# Patient Record
Sex: FEMALE | Race: WHITE | NOT HISPANIC OR LATINO | Employment: UNEMPLOYED | URBAN - METROPOLITAN AREA
[De-identification: names, ages, dates, MRNs, and addresses within clinical notes are randomized per-mention and may not be internally consistent; named-entity substitution may affect disease eponyms.]

---

## 2023-03-17 LAB — THYROTROPIN (MIU/L) IN SER/PLAS BY DETECTION LIMIT <= 0.05 MIU/L: 2.9 MIU/L (ref 0.44–3.98)

## 2023-10-18 RX ORDER — LINACLOTIDE 290 UG/1
290 CAPSULE, GELATIN COATED ORAL
COMMUNITY
End: 2023-10-24 | Stop reason: SDUPTHER

## 2023-10-18 NOTE — TELEPHONE ENCOUNTER
PT called in requesting a med refill med populated also pharmacy. I scheduled physical + med review for Tuesday of next week. Please advise

## 2023-10-23 PROBLEM — E01.0 THYROMEGALY: Status: ACTIVE | Noted: 2023-10-23

## 2023-10-23 PROBLEM — E55.9 VITAMIN D DEFICIENCY: Status: ACTIVE | Noted: 2023-10-23

## 2023-10-23 PROBLEM — R51.9 FREQUENT HEADACHES: Status: ACTIVE | Noted: 2023-10-23

## 2023-10-23 PROBLEM — F41.1 GENERALIZED ANXIETY DISORDER: Status: ACTIVE | Noted: 2023-10-23

## 2023-10-23 PROBLEM — E03.9 ACQUIRED HYPOTHYROIDISM: Status: ACTIVE | Noted: 2023-10-23

## 2023-10-23 PROBLEM — F32.A DEPRESSION: Status: ACTIVE | Noted: 2023-10-23

## 2023-10-23 PROBLEM — I47.10 SVT (SUPRAVENTRICULAR TACHYCARDIA) (CMS-HCC): Status: ACTIVE | Noted: 2023-10-23

## 2023-10-23 PROBLEM — D50.9 IRON DEFICIENCY ANEMIA: Status: ACTIVE | Noted: 2023-10-23

## 2023-10-23 PROBLEM — K58.1 IRRITABLE BOWEL SYNDROME WITH CONSTIPATION: Status: ACTIVE | Noted: 2023-10-23

## 2023-10-23 PROBLEM — F32.A MODERATELY SEVERE DEPRESSION: Status: ACTIVE | Noted: 2023-10-23

## 2023-10-23 PROBLEM — R42 DIZZINESS: Status: ACTIVE | Noted: 2023-10-23

## 2023-10-23 PROBLEM — R00.2 PALPITATIONS: Status: ACTIVE | Noted: 2023-10-23

## 2023-10-23 PROBLEM — E04.9 GOITER: Status: ACTIVE | Noted: 2023-10-23

## 2023-10-23 PROBLEM — G90.A POTS (POSTURAL ORTHOSTATIC TACHYCARDIA SYNDROME): Status: ACTIVE | Noted: 2023-10-23

## 2023-10-23 RX ORDER — CITALOPRAM 20 MG/1
1.5 TABLET, FILM COATED ORAL DAILY
COMMUNITY
Start: 2012-11-20 | End: 2023-10-24 | Stop reason: ALTCHOICE

## 2023-10-23 RX ORDER — MELATONIN 10 MG/ML
2 DROPS ORAL DAILY
COMMUNITY

## 2023-10-23 RX ORDER — IBUPROFEN 600 MG/1
TABLET ORAL
COMMUNITY
Start: 2012-12-05

## 2023-10-23 RX ORDER — NORETHINDRONE ACETATE AND ETHINYL ESTRADIOL, ETHINYL ESTRADIOL AND FERROUS FUMARATE 1MG-10(24)
1 KIT ORAL DAILY
COMMUNITY
Start: 2013-04-26 | End: 2023-10-24 | Stop reason: ALTCHOICE

## 2023-10-23 RX ORDER — LEVOTHYROXINE SODIUM 50 UG/1
1 TABLET ORAL
COMMUNITY
Start: 2022-03-23 | End: 2023-10-24 | Stop reason: SDUPTHER

## 2023-10-24 ENCOUNTER — OFFICE VISIT (OUTPATIENT)
Dept: PRIMARY CARE | Facility: CLINIC | Age: 33
End: 2023-10-24
Payer: COMMERCIAL

## 2023-10-24 VITALS
SYSTOLIC BLOOD PRESSURE: 110 MMHG | DIASTOLIC BLOOD PRESSURE: 70 MMHG | BODY MASS INDEX: 23.87 KG/M2 | HEIGHT: 64 IN | TEMPERATURE: 96.3 F | OXYGEN SATURATION: 99 % | WEIGHT: 139.8 LBS | HEART RATE: 84 BPM

## 2023-10-24 DIAGNOSIS — E04.9 GOITER: ICD-10-CM

## 2023-10-24 DIAGNOSIS — D50.8 IRON DEFICIENCY ANEMIA SECONDARY TO INADEQUATE DIETARY IRON INTAKE: ICD-10-CM

## 2023-10-24 DIAGNOSIS — E55.9 VITAMIN D DEFICIENCY: ICD-10-CM

## 2023-10-24 DIAGNOSIS — E03.9 ACQUIRED HYPOTHYROIDISM: ICD-10-CM

## 2023-10-24 DIAGNOSIS — Z00.00 ROUTINE HEALTH MAINTENANCE: Primary | ICD-10-CM

## 2023-10-24 DIAGNOSIS — K58.1 IRRITABLE BOWEL SYNDROME WITH CONSTIPATION: ICD-10-CM

## 2023-10-24 PROCEDURE — 99213 OFFICE O/P EST LOW 20 MIN: CPT | Performed by: FAMILY MEDICINE

## 2023-10-24 PROCEDURE — 99395 PREV VISIT EST AGE 18-39: CPT | Performed by: FAMILY MEDICINE

## 2023-10-24 PROCEDURE — 99213 OFFICE O/P EST LOW 20 MIN: CPT | Mod: 25 | Performed by: FAMILY MEDICINE

## 2023-10-24 PROCEDURE — 1036F TOBACCO NON-USER: CPT | Performed by: FAMILY MEDICINE

## 2023-10-24 RX ORDER — LINACLOTIDE 290 UG/1
290 CAPSULE, GELATIN COATED ORAL
Qty: 30 CAPSULE | OUTPATIENT
Start: 2023-10-24

## 2023-10-24 RX ORDER — LEVOTHYROXINE SODIUM 50 UG/1
50 TABLET ORAL
Qty: 90 TABLET | Refills: 0 | Status: SHIPPED | OUTPATIENT
Start: 2023-10-24 | End: 2024-04-19

## 2023-10-24 RX ORDER — LINACLOTIDE 290 UG/1
290 CAPSULE, GELATIN COATED ORAL
Qty: 90 CAPSULE | Refills: 1 | Status: SHIPPED | OUTPATIENT
Start: 2023-10-24 | End: 2024-04-19

## 2023-10-24 ASSESSMENT — PATIENT HEALTH QUESTIONNAIRE - PHQ9
2. FEELING DOWN, DEPRESSED OR HOPELESS: NOT AT ALL
SUM OF ALL RESPONSES TO PHQ9 QUESTIONS 1 AND 2: 0
1. LITTLE INTEREST OR PLEASURE IN DOING THINGS: NOT AT ALL

## 2023-10-24 ASSESSMENT — PAIN SCALES - GENERAL: PAINLEVEL: 0-NO PAIN

## 2023-10-24 NOTE — PATIENT INSTRUCTIONS
Problem List Items Addressed This Visit             ICD-10-CM    Acquired hypothyroidism E03.9    Relevant Medications    levothyroxine (Synthroid, Levoxyl) 50 mcg tablet    Other Relevant Orders    Lipid Panel    Thyroid Stimulating Hormone    CBC    Comprehensive Metabolic Panel    Vitamin D 25-Hydroxy,Total (for eval of Vitamin D levels)    Iron and TIBC    Irritable bowel syndrome with constipation K58.1    Relevant Medications    Linzess 290 mcg capsule    Other Relevant Orders    Lipid Panel    Thyroid Stimulating Hormone    CBC    Comprehensive Metabolic Panel    Vitamin D 25-Hydroxy,Total (for eval of Vitamin D levels)    Iron and TIBC    Goiter E04.9    Relevant Orders    US thyroid    Iron deficiency anemia D50.9    Relevant Orders    Lipid Panel    Thyroid Stimulating Hormone    CBC    Comprehensive Metabolic Panel    Vitamin D 25-Hydroxy,Total (for eval of Vitamin D levels)    Iron and TIBC    Vitamin D deficiency E55.9    Relevant Orders    Lipid Panel    Thyroid Stimulating Hormone    CBC    Comprehensive Metabolic Panel    Vitamin D 25-Hydroxy,Total (for eval of Vitamin D levels)    Iron and TIBC     Other Visit Diagnoses         Codes    Routine health maintenance    -  Primary Z00.00    Relevant Orders    Lipid Panel    Thyroid Stimulating Hormone    CBC    Comprehensive Metabolic Panel    Vitamin D 25-Hydroxy,Total (for eval of Vitamin D levels)    Iron and TIBC            Additional Visit Plans:  - Complete history and physical examination was performed      GENERAL RECOMMENDATIONS:  - Provided you with handouts on healthy eating, including the Top Ten Tips for a Healthy Diet  - I encourage you to eat a low-fat, moderate-carbohydrate, low-calorie diet to maintain a normal BMI (under 25) to reduce heart disease, and risk for diabetes  - Moderate intensity exercise for 30 minutes 5 days per week is recommended  - Along with recommendations for nutrition and exercise discussed today helpful resources  recommended by the American Academy of Family Practice can be found at www.familydoctor.org or www.choosemyplate.gov  - Can also consider enrolling in a program such as Weight Watchers or Ele [place]. The most effective diet is going to be one you can follow long term and turn into a lifestyle  - I recommend a routine vision check and dental cleanings every 6 months      BLOOD TESTING:  - We will obtain routine blood work, please have this done when you are fasting. The office will notify you of the test results once they are available and make any treatment recommendations accordingly  - Blood work may include a cholesterol and diabetes screen if risk factors exist (overweight, high blood pressure etc); screening for sexually transmitted infections; and a one time Hepatitis C Virus screen for those born between 4160-8742.      VACCINATION RECOMMENDATIONS:  - Flu shot annually.  - Tetanus booster every 10 years.  Up-to-date, due 2027  - HPV vaccine. Reportedly completed  - Two pneumonia vaccinations starting at 65 years old (or earlier if risk factors - smoker, diabetic, heart or lung conditions) - not due yet.  - Shingles vaccine for those 50 years or older - not due yet.      SCREENING RECOMMENDATIONS:  - Cervical cancer screening (pap test) in women starting at age 21 until age 65 years old.  Up-to-date, continue see gynecology  - Mammogram screening for breast cancer in women starting at 40-50 years and every 1-2 years - not due yet.  - Bone density screening (DEXA) for osteoporosis in women aged 65 years and older (in younger women who are higher risk) - not due yet.  - Colon cancer screening (with colonoscopy or Cologuard) for men and women starting at age 45 until 74 years old (or earlier if family history of colon cancer) - not due yet.    -Medication refills provided today.  We will let you know if we need to adjust your thyroid medication dose based on blood work results.  Continue to work on good  digestive health and follow-up in 6 months for medication recheck and refills or sooner if needed.  We will let you know if we need to adjust your vitamin D or iron dosing    - Plan to recheck thyroid ultrasound in April    Next Wellness Exam/Annual Physical Due  In 1 year from today    Patient Care Team:  Luis Alfredo Hein DO as PCP - General  Luis Alfredo Hein DO as PCP - [facility] Medicaid PCP  Luis Alfredo Hein DO (Family Medicine)    Luis Alfredo Hein DO   10/24/23   11:51 AM

## 2023-10-24 NOTE — PROGRESS NOTES
Outpatient Visit Note    Chief Complaint   Patient presents with    Annual Exam     Pt needs refills on linzess       HPI:  Elvin Osullivan is a 33 y.o. female here  for a complete physical.  Would also like to do a medication follow-up visit separately today to discuss her conditions and get refills.    She has iron deficiency anemia for which she is on slow release and iron.  She takes this 2-3 times a week. Any more makes her constipated.     She has IBS-C which is well managed with Linzess use.  She takes this daily without any diarrhea.  Tolerated well without concerning side effects.  Needs refills today.    Has hypothyroidism for which she is on 50 mcg of levothyroxine once daily.  Denies any heat or cold intolerance, dry skin, diarrhea, constipation or unintentional weight changes.  Having some hair loss. No concerning side effects.    She has vitamin D deficiency for which she is on 2000 IU of vitamin D daily.    She is due for routine blood work at this time.    Health Maintenance.  Immunizations: Tdap is due 2027.  She thinks HPV vaccine was received. Influenza vaccine declined.    Denies smoking or illicit drug use, drinks 0 alcoholic beverages a week. Patient reports routine vision checks and dental cleanings, and does not get regular exercise. Patient is not fasting for routine blood work today.    There is no family history of colon, breast or ovarian cancer.  Screening pap done 2021 with gynecology and was negative.  She continues to see gynecology.    Otherwise denies fevers, chills, cold/flu symptoms, SOB, CP, N/V, abdominal pain, dysuria, hematuria, melena, diarrhea or LE edema      Past Medical History:   Diagnosis Date    Anxiety     Depression     Disease of thyroid gland     Encounter for initial prescription of contraceptive pills 05/17/2013    Encounter for initial prescription of contraceptive pills        Current Medications  Current Outpatient Medications   Medication Instructions     cholecalciferol, vitamin D3, 50 mcg (2,000 unit) tablet,chewable 2 capsules, oral, Daily    ferrous sulfate ER (Slow Fe) 142 mg ER tablet 1 tablet, oral, Daily, For 30 days    ibuprofen 600 mg tablet oral    levothyroxine (SYNTHROID, LEVOXYL) 50 mcg, oral, Daily before breakfast, on an empty stomach    Linzess 290 mcg, oral, Daily before breakfast        Allergies  No Known Allergies     Immunizations  Immunization History   Administered Date(s) Administered    Flu vaccine (IIV4), preservative free *Check age/dose* 11/08/2021    Tdap vaccine, age 7 year and older (BOOSTRIX) 01/01/2017        History reviewed. No pertinent surgical history.  Family History   Problem Relation Name Age of Onset    Hypertension Mother      Anxiety disorder Mother      Hypertension Father      Hyperlipidemia Father      COPD Paternal Grandmother      Hypertension Paternal Grandmother      Diabetes Paternal Grandmother       Social History     Tobacco Use    Smoking status: Never     Passive exposure: Never    Smokeless tobacco: Never   Vaping Use    Vaping Use: Never used   Substance Use Topics    Alcohol use: Never    Drug use: Never     Tobacco Use: Low Risk  (10/24/2023)    Patient History     Smoking Tobacco Use: Never     Smokeless Tobacco Use: Never     Passive Exposure: Never        ROS  All pertinent positive symptoms are included in the history of present illness.  All other systems have been reviewed and are negative and noncontributory to this patient's current ailments.    VITAL SIGNS  Vitals:    10/24/23 1121   BP: 110/70   Pulse: 84   Temp: 35.7 °C (96.3 °F)   SpO2: 99%     Vitals:    10/24/23 1121   Weight: 63.4 kg (139 lb 12.8 oz)      Body mass index is 24 kg/m².     PHYSICAL EXAM  GENERAL APPEARANCE: well nourished, well developed, looks like stated age, in no acute distress, not ill or tired appearing, conversing well.   HEENT: no trauma, normocephalic. PERRLA and EOMI with normal external exam. TM's intact with no  injection or effusion, no signs of infection. Nares patent, turbinates pink without discharge. Pharynx pink with no exudates or lesions, no enlarged tonsils.   NECK: no nodes, supple without rigidity, no neck mass was observed, thyromegaly present  HEART: regular rate and rhythm, S1 and S2 heard with no murmurs or skipped beats, no carotid bruits.   LUNGS: clear to auscultation bilaterally with no wheezes, crackles or rales.   ABDOMEN: no organomegaly, soft, nontender, nondistended, normal bowel sounds, no guarding/rebound/rigidity.   EXTREMITIES: moving all extremities equally with no edema or deformities.   SKIN: normal skin color and pigmentation, normal skin turgor without rash, lesions, or nodules visualized.   NEUROLOGIC EXAM: CN II-XII grossly intact, normal gait, normal balance, 5/5 muscle strength, sensation grossly intact.   PSYCH: mood and affect appropriate; alert and oriented to time, place, person; no difficulty with speech or language.   LYMPH NODES: no cervical lymphadenopathy.           Assessment/Plan   Problem List Items Addressed This Visit             ICD-10-CM    Acquired hypothyroidism E03.9    Relevant Medications    levothyroxine (Synthroid, Levoxyl) 50 mcg tablet    Other Relevant Orders    Lipid Panel    Thyroid Stimulating Hormone    CBC    Comprehensive Metabolic Panel    Vitamin D 25-Hydroxy,Total (for eval of Vitamin D levels)    Iron and TIBC    Irritable bowel syndrome with constipation K58.1    Relevant Medications    Linzess 290 mcg capsule    Other Relevant Orders    Lipid Panel    Thyroid Stimulating Hormone    CBC    Comprehensive Metabolic Panel    Vitamin D 25-Hydroxy,Total (for eval of Vitamin D levels)    Iron and TIBC    Goiter E04.9    Relevant Orders    US thyroid    Iron deficiency anemia D50.9    Relevant Orders    Lipid Panel    Thyroid Stimulating Hormone    CBC    Comprehensive Metabolic Panel    Vitamin D 25-Hydroxy,Total (for eval of Vitamin D levels)    Iron and  TIBC    Vitamin D deficiency E55.9    Relevant Orders    Lipid Panel    Thyroid Stimulating Hormone    CBC    Comprehensive Metabolic Panel    Vitamin D 25-Hydroxy,Total (for eval of Vitamin D levels)    Iron and TIBC     Other Visit Diagnoses         Codes    Routine health maintenance    -  Primary Z00.00    Relevant Orders    Lipid Panel    Thyroid Stimulating Hormone    CBC    Comprehensive Metabolic Panel    Vitamin D 25-Hydroxy,Total (for eval of Vitamin D levels)    Iron and TIBC            Additional Visit Plans:  - Complete history and physical examination was performed      GENERAL RECOMMENDATIONS:  - Provided you with handouts on healthy eating, including the Top Ten Tips for a Healthy Diet  - I encourage you to eat a low-fat, moderate-carbohydrate, low-calorie diet to maintain a normal BMI (under 25) to reduce heart disease, and risk for diabetes  - Moderate intensity exercise for 30 minutes 5 days per week is recommended  - Along with recommendations for nutrition and exercise discussed today helpful resources recommended by the American Academy of Family Practice can be found at www.familydoctor.org or www.choosemyplate.gov  - Can also consider enrolling in a program such as Weight Watchers or Ele [place]. The most effective diet is going to be one you can follow long term and turn into a lifestyle  - I recommend a routine vision check and dental cleanings every 6 months      BLOOD TESTING:  - We will obtain routine blood work, please have this done when you are fasting. The office will notify you of the test results once they are available and make any treatment recommendations accordingly  - Blood work may include a cholesterol and diabetes screen if risk factors exist (overweight, high blood pressure etc); screening for sexually transmitted infections; and a one time Hepatitis C Virus screen for those born between 3102-9288.      VACCINATION RECOMMENDATIONS:  - Flu shot annually.  - Tetanus booster  every 10 years.  Up-to-date, due 2027  - HPV vaccine. Reportedly completed  - Two pneumonia vaccinations starting at 65 years old (or earlier if risk factors - smoker, diabetic, heart or lung conditions) - not due yet.  - Shingles vaccine for those 50 years or older - not due yet.      SCREENING RECOMMENDATIONS:  - Cervical cancer screening (pap test) in women starting at age 21 until age 65 years old.  Up-to-date, continue see gynecology  - Mammogram screening for breast cancer in women starting at 40-50 years and every 1-2 years - not due yet.  - Bone density screening (DEXA) for osteoporosis in women aged 65 years and older (in younger women who are higher risk) - not due yet.  - Colon cancer screening (with colonoscopy or Cologuard) for men and women starting at age 45 until 74 years old (or earlier if family history of colon cancer) - not due yet.    -Medication refills provided today.  We will let you know if we need to adjust your thyroid medication dose based on blood work results.  Continue to work on good digestive health and follow-up in 6 months for medication recheck and refills or sooner if needed.  We will let you know if we need to adjust your vitamin D or iron dosing    - Plan to recheck thyroid ultrasound in April    Next Wellness Exam/Annual Physical Due  In 1 year from today    Patient Care Team:  Luis Alfredo Hein DO as PCP - General  Luis Alfredo Hein DO as PCP - [facility] Medicaid PCP  Luis Alfredo Hein DO (Family Medicine)    Luis Alfredo Hein DO   10/27/23   9:47 AM

## 2023-11-22 DIAGNOSIS — E03.9 ACQUIRED HYPOTHYROIDISM: ICD-10-CM

## 2023-11-24 ENCOUNTER — APPOINTMENT (OUTPATIENT)
Dept: PHYSICAL THERAPY | Facility: CLINIC | Age: 33
End: 2023-11-24
Payer: COMMERCIAL

## 2023-11-28 RX ORDER — LEVOTHYROXINE SODIUM 50 UG/1
50 TABLET ORAL
Qty: 90 TABLET | Refills: 0 | OUTPATIENT
Start: 2023-11-28 | End: 2024-02-26

## 2024-03-07 ENCOUNTER — LAB (OUTPATIENT)
Dept: LAB | Facility: LAB | Age: 34
End: 2024-03-07
Payer: COMMERCIAL

## 2024-03-07 DIAGNOSIS — E03.9 ACQUIRED HYPOTHYROIDISM: ICD-10-CM

## 2024-03-07 DIAGNOSIS — K58.1 IRRITABLE BOWEL SYNDROME WITH CONSTIPATION: ICD-10-CM

## 2024-03-07 DIAGNOSIS — D50.8 IRON DEFICIENCY ANEMIA SECONDARY TO INADEQUATE DIETARY IRON INTAKE: ICD-10-CM

## 2024-03-07 DIAGNOSIS — Z00.00 ROUTINE HEALTH MAINTENANCE: ICD-10-CM

## 2024-03-07 DIAGNOSIS — E55.9 VITAMIN D DEFICIENCY: ICD-10-CM

## 2024-03-07 LAB
25(OH)D3 SERPL-MCNC: 28 NG/ML (ref 30–100)
ALBUMIN SERPL BCP-MCNC: 4.8 G/DL (ref 3.4–5)
ALP SERPL-CCNC: 53 U/L (ref 33–110)
ALT SERPL W P-5'-P-CCNC: 8 U/L (ref 7–45)
ANION GAP SERPL CALC-SCNC: 11 MMOL/L (ref 10–20)
AST SERPL W P-5'-P-CCNC: 13 U/L (ref 9–39)
BILIRUB SERPL-MCNC: 0.5 MG/DL (ref 0–1.2)
BUN SERPL-MCNC: 8 MG/DL (ref 6–23)
CALCIUM SERPL-MCNC: 10.2 MG/DL (ref 8.6–10.6)
CHLORIDE SERPL-SCNC: 106 MMOL/L (ref 98–107)
CHOLEST SERPL-MCNC: 155 MG/DL (ref 0–199)
CHOLESTEROL/HDL RATIO: 2.5
CO2 SERPL-SCNC: 27 MMOL/L (ref 21–32)
CREAT SERPL-MCNC: 0.65 MG/DL (ref 0.5–1.05)
EGFRCR SERPLBLD CKD-EPI 2021: >90 ML/MIN/1.73M*2
ERYTHROCYTE [DISTWIDTH] IN BLOOD BY AUTOMATED COUNT: 15.6 % (ref 11.5–14.5)
GLUCOSE SERPL-MCNC: 72 MG/DL (ref 74–99)
HCT VFR BLD AUTO: 39.4 % (ref 36–46)
HDLC SERPL-MCNC: 61.4 MG/DL
HGB BLD-MCNC: 11.4 G/DL (ref 12–16)
IRON SATN MFR SERPL: 6 % (ref 25–45)
IRON SERPL-MCNC: 24 UG/DL (ref 35–150)
LDLC SERPL CALC-MCNC: 80 MG/DL
MCH RBC QN AUTO: 24.7 PG (ref 26–34)
MCHC RBC AUTO-ENTMCNC: 28.9 G/DL (ref 32–36)
MCV RBC AUTO: 85 FL (ref 80–100)
NON HDL CHOLESTEROL: 94 MG/DL (ref 0–149)
NRBC BLD-RTO: 0 /100 WBCS (ref 0–0)
PLATELET # BLD AUTO: 447 X10*3/UL (ref 150–450)
POTASSIUM SERPL-SCNC: 4.4 MMOL/L (ref 3.5–5.3)
PROT SERPL-MCNC: 7.5 G/DL (ref 6.4–8.2)
RBC # BLD AUTO: 4.62 X10*6/UL (ref 4–5.2)
SODIUM SERPL-SCNC: 140 MMOL/L (ref 136–145)
TIBC SERPL-MCNC: 430 UG/DL (ref 240–445)
TRIGL SERPL-MCNC: 69 MG/DL (ref 0–149)
TSH SERPL-ACNC: 2.98 MIU/L (ref 0.44–3.98)
UIBC SERPL-MCNC: 406 UG/DL (ref 110–370)
VLDL: 14 MG/DL (ref 0–40)
WBC # BLD AUTO: 5.6 X10*3/UL (ref 4.4–11.3)

## 2024-03-07 PROCEDURE — 84443 ASSAY THYROID STIM HORMONE: CPT

## 2024-03-07 PROCEDURE — 85027 COMPLETE CBC AUTOMATED: CPT

## 2024-03-07 PROCEDURE — 36415 COLL VENOUS BLD VENIPUNCTURE: CPT

## 2024-03-07 PROCEDURE — 83550 IRON BINDING TEST: CPT

## 2024-03-07 PROCEDURE — 83540 ASSAY OF IRON: CPT

## 2024-03-07 PROCEDURE — 80061 LIPID PANEL: CPT

## 2024-03-07 PROCEDURE — 82306 VITAMIN D 25 HYDROXY: CPT

## 2024-03-07 PROCEDURE — 80053 COMPREHEN METABOLIC PANEL: CPT

## 2024-03-19 DIAGNOSIS — E55.9 VITAMIN D DEFICIENCY: Primary | ICD-10-CM

## 2024-03-19 DIAGNOSIS — D50.8 IRON DEFICIENCY ANEMIA SECONDARY TO INADEQUATE DIETARY IRON INTAKE: ICD-10-CM

## 2024-03-28 ENCOUNTER — HOSPITAL ENCOUNTER (OUTPATIENT)
Dept: RADIOLOGY | Facility: CLINIC | Age: 34
Discharge: HOME | End: 2024-03-28
Payer: COMMERCIAL

## 2024-03-28 DIAGNOSIS — E04.9 GOITER: ICD-10-CM

## 2024-03-28 PROCEDURE — 76536 US EXAM OF HEAD AND NECK: CPT | Performed by: RADIOLOGY

## 2024-03-28 PROCEDURE — 76536 US EXAM OF HEAD AND NECK: CPT

## 2024-04-17 DIAGNOSIS — K58.1 IRRITABLE BOWEL SYNDROME WITH CONSTIPATION: ICD-10-CM

## 2024-04-17 DIAGNOSIS — E03.9 ACQUIRED HYPOTHYROIDISM: ICD-10-CM

## 2024-04-17 NOTE — TELEPHONE ENCOUNTER
Pharmacy requesting refills for populated medication. Lm for pt to call back to schedule 6 month follow up.

## 2024-04-19 RX ORDER — LEVOTHYROXINE SODIUM 50 UG/1
50 TABLET ORAL
Qty: 90 TABLET | Refills: 0 | Status: SHIPPED | OUTPATIENT
Start: 2024-04-19 | End: 2024-07-18

## 2024-04-19 RX ORDER — LINACLOTIDE 290 UG/1
290 CAPSULE, GELATIN COATED ORAL
Qty: 30 CAPSULE | Refills: 5 | Status: SHIPPED | OUTPATIENT
Start: 2024-04-19 | End: 2024-10-16

## 2024-05-13 ENCOUNTER — APPOINTMENT (OUTPATIENT)
Dept: OBSTETRICS AND GYNECOLOGY | Facility: CLINIC | Age: 34
End: 2024-05-13
Payer: COMMERCIAL

## 2024-07-16 DIAGNOSIS — E03.9 ACQUIRED HYPOTHYROIDISM: ICD-10-CM

## 2024-07-17 NOTE — TELEPHONE ENCOUNTER
Last OV 03/07/24  Next OV Not scheduled sent [facility] message to schedule a follow up appt.   Last Filled 04/19/24

## 2024-07-18 RX ORDER — LEVOTHYROXINE SODIUM 50 UG/1
50 TABLET ORAL
Qty: 30 TABLET | Refills: 2 | Status: SHIPPED | OUTPATIENT
Start: 2024-07-18 | End: 2024-10-16

## 2024-10-04 DIAGNOSIS — E03.9 ACQUIRED HYPOTHYROIDISM: ICD-10-CM

## 2024-10-07 RX ORDER — LEVOTHYROXINE SODIUM 50 UG/1
50 TABLET ORAL
Qty: 30 TABLET | Refills: 1 | Status: SHIPPED | OUTPATIENT
Start: 2024-10-07 | End: 2025-01-05

## 2024-10-14 ENCOUNTER — PATIENT MESSAGE (OUTPATIENT)
Dept: PRIMARY CARE | Facility: CLINIC | Age: 34
End: 2024-10-14
Payer: COMMERCIAL

## 2024-11-11 ENCOUNTER — OFFICE VISIT (OUTPATIENT)
Dept: PRIMARY CARE | Facility: CLINIC | Age: 34
End: 2024-11-11
Payer: COMMERCIAL

## 2024-11-11 VITALS
TEMPERATURE: 97.7 F | OXYGEN SATURATION: 97 % | HEART RATE: 105 BPM | RESPIRATION RATE: 16 BRPM | DIASTOLIC BLOOD PRESSURE: 70 MMHG | BODY MASS INDEX: 21.34 KG/M2 | WEIGHT: 125 LBS | SYSTOLIC BLOOD PRESSURE: 108 MMHG | HEIGHT: 64 IN

## 2024-11-11 DIAGNOSIS — E55.9 VITAMIN D DEFICIENCY: ICD-10-CM

## 2024-11-11 DIAGNOSIS — E03.9 ACQUIRED HYPOTHYROIDISM: ICD-10-CM

## 2024-11-11 DIAGNOSIS — D50.8 IRON DEFICIENCY ANEMIA SECONDARY TO INADEQUATE DIETARY IRON INTAKE: ICD-10-CM

## 2024-11-11 DIAGNOSIS — Z00.00 ROUTINE HEALTH MAINTENANCE: ICD-10-CM

## 2024-11-11 DIAGNOSIS — K58.1 IRRITABLE BOWEL SYNDROME WITH CONSTIPATION: Primary | ICD-10-CM

## 2024-11-11 PROCEDURE — 99214 OFFICE O/P EST MOD 30 MIN: CPT | Performed by: FAMILY MEDICINE

## 2024-11-11 PROCEDURE — 3008F BODY MASS INDEX DOCD: CPT | Performed by: FAMILY MEDICINE

## 2024-11-11 RX ORDER — LINACLOTIDE 290 UG/1
290 CAPSULE, GELATIN COATED ORAL
Qty: 90 CAPSULE | Refills: 1 | Status: SHIPPED | OUTPATIENT
Start: 2024-11-11 | End: 2025-05-10

## 2024-11-11 RX ORDER — LEVOTHYROXINE SODIUM 50 UG/1
50 TABLET ORAL
Qty: 90 TABLET | Refills: 1 | Status: SHIPPED | OUTPATIENT
Start: 2024-11-11 | End: 2025-05-10

## 2024-11-11 ASSESSMENT — ENCOUNTER SYMPTOMS
DEPRESSION: 0
OCCASIONAL FEELINGS OF UNSTEADINESS: 0
LOSS OF SENSATION IN FEET: 0

## 2024-11-11 ASSESSMENT — PATIENT HEALTH QUESTIONNAIRE - PHQ9
2. FEELING DOWN, DEPRESSED OR HOPELESS: NOT AT ALL
1. LITTLE INTEREST OR PLEASURE IN DOING THINGS: NOT AT ALL
SUM OF ALL RESPONSES TO PHQ9 QUESTIONS 1 & 2: 0

## 2024-11-11 ASSESSMENT — PAIN SCALES - GENERAL: PAINLEVEL_OUTOF10: 0-NO PAIN

## 2024-11-11 ASSESSMENT — COLUMBIA-SUICIDE SEVERITY RATING SCALE - C-SSRS
2. HAVE YOU ACTUALLY HAD ANY THOUGHTS OF KILLING YOURSELF?: NO
6. HAVE YOU EVER DONE ANYTHING, STARTED TO DO ANYTHING, OR PREPARED TO DO ANYTHING TO END YOUR LIFE?: NO
1. IN THE PAST MONTH, HAVE YOU WISHED YOU WERE DEAD OR WISHED YOU COULD GO TO SLEEP AND NOT WAKE UP?: NO

## 2024-11-11 NOTE — PROGRESS NOTES
Outpatient Visit Note    Chief Complaint   Patient presents with    Follow-up     Meds        HPI:  Elvin Osullivan is a 34 y.o. female here for follow-up on her medications.    She has iron deficiency anemia for which she is on slow release and iron.  She takes this 2-3 times a week. Any more makes her constipated.      She has IBS-C which is well managed with Linzess use.  She takes this daily without any diarrhea.  Tolerated well without concerning side effects.  Needs refills today.     Has hypothyroidism for which she is on 50 mcg of levothyroxine once daily.  Denies any heat intolerance, dry skin, diarrhea, constipation.  Having some hair loss at baseline. Maybe feeling a little cold, has lost some weight in the last year, still normal BMI. No concerning side effects.      She has vitamin D deficiency for which she is on 2000 IU of vitamin D daily.     She is due for a complete physical    PHQ9/GAD7:         Patient Active Problem List    Diagnosis Date Noted    Acquired hypothyroidism 10/23/2023    Irritable bowel syndrome with constipation 10/23/2023    Depression 10/23/2023    Dizziness 10/23/2023    Frequent headaches 10/23/2023    Generalized anxiety disorder 10/23/2023    Goiter 10/23/2023    Thyromegaly 10/23/2023    Iron deficiency anemia 10/23/2023    Moderately severe depression 10/23/2023    Palpitations 10/23/2023    POTS (postural orthostatic tachycardia syndrome) 10/23/2023    SVT (supraventricular tachycardia) (CMS-[facility]) 10/23/2023    Vitamin D deficiency 10/23/2023        Past Medical History:   Diagnosis Date    Anxiety     Depression     Disease of thyroid gland     Encounter for initial prescription of contraceptive pills 05/17/2013    Encounter for initial prescription of contraceptive pills        Current Medications  Current Outpatient Medications   Medication Instructions    cholecalciferol, vitamin D3, 50 mcg (2,000 unit) tablet,chewable 2 capsules, Daily    ferrous sulfate ER  (Slow Fe) 142 mg ER tablet 1 tablet, Daily    levothyroxine (SYNTHROID, LEVOXYL) 50 mcg, oral, Daily before breakfast, on an empty stomach    Linzess 290 mcg, oral, Daily before breakfast        Allergies  No Known Allergies     History reviewed. No pertinent surgical history.  Family History   Problem Relation Name Age of Onset    Hypertension Mother      Anxiety disorder Mother      Hypertension Father      Hyperlipidemia Father      COPD Paternal Grandmother      Hypertension Paternal Grandmother      Diabetes Paternal Grandmother       Social History     Tobacco Use    Smoking status: Never     Passive exposure: Never    Smokeless tobacco: Never   Vaping Use    Vaping status: Never Used   Substance Use Topics    Alcohol use: Never    Drug use: Never     Tobacco Use: Low Risk  (11/11/2024)    Patient History     Smoking Tobacco Use: Never     Smokeless Tobacco Use: Never     Passive Exposure: Never        ROS  All pertinent positive symptoms are included in the history of present illness.  All other systems have been reviewed and are negative and noncontributory to this patient's current ailments.    VITAL SIGNS  Vitals:    11/11/24 1400   BP: 108/70   Pulse: 105   Resp: 16   Temp: 36.5 °C (97.7 °F)   SpO2: 97%     Vitals:    11/11/24 1400   Weight: 56.7 kg (125 lb)      Body mass index is 21.45 kg/m².     PHYSICAL EXAM  GENERAL APPEARANCE: well nourished, well developed, looks like stated age, in no acute distress, not ill or tired appearing, conversing well.   HEENT: no trauma, normocephalic.   NECK: no nodes, supple without rigidity, no neck mass was observed,  stable thyromegaly.   HEART: regular rate and rhythm, S1 and S2 heard with no murmurs or skipped beats. No carotid bruits.  LUNGS: clear to auscultation bilaterally with no wheezes, crackles or rales.   EXTREMITIES: moving all extremities equally with no edema or deformities.   SKIN: normal skin color and pigmentation, normal skin turgor without rash,  lesions, or nodules visualized.   NEUROLOGIC EXAM: CN II-XII grossly intact, normal gait, normal balance.   PSYCH: mood and affect appropriate; alert and oriented to time, place, person; no difficulty with speech or language.       Counseling:       Medication education:           Education:  The patient is counseled regarding potential side-effects of all new medications          Understanding:  Patient expressed understanding of information conveyed today          Adherence:  No barriers to adherence identified     Assessment/Plan   Problem List Items Addressed This Visit             ICD-10-CM    Acquired hypothyroidism E03.9    Relevant Medications    levothyroxine (Synthroid, Levoxyl) 50 mcg tablet    Other Relevant Orders    Comprehensive Metabolic Panel    Lipid Panel    CBC    TSH with reflex to Free T4 if abnormal    Iron and TIBC    Vitamin D 25-Hydroxy,Total (for eval of Vitamin D levels)    Irritable bowel syndrome with constipation - Primary K58.1    Relevant Medications    Linzess 290 mcg capsule    Other Relevant Orders    Comprehensive Metabolic Panel    Lipid Panel    CBC    TSH with reflex to Free T4 if abnormal    Iron and TIBC    Vitamin D 25-Hydroxy,Total (for eval of Vitamin D levels)    Iron deficiency anemia D50.9    Relevant Orders    Comprehensive Metabolic Panel    Lipid Panel    CBC    TSH with reflex to Free T4 if abnormal    Iron and TIBC    Vitamin D 25-Hydroxy,Total (for eval of Vitamin D levels)    Vitamin D deficiency E55.9    Relevant Orders    Comprehensive Metabolic Panel    Lipid Panel    CBC    TSH with reflex to Free T4 if abnormal    Iron and TIBC    Vitamin D 25-Hydroxy,Total (for eval of Vitamin D levels)       Additional Visit Plans:  Plan for routine labs to monitor your chronic conditions and assess for any changes in your thyroid, vitamin D and iron dosing.  Follow-up every 6 months or sooner if needed.  Refills provided    Stable thyromegaly - no follow up ultrasound  needed but maybe repeat in a few years or if changing.     Next Wellness Exam/Annual Physical Due  Due at this time, return at your earliest convenience    Patient Care Team:  Luis Alfredo Hein, DO as PCP - General (Family Medicine)  Luis Alfredo Hein, DO as PCP - [facility] Medicaid PCP  Luis Alfredo Hein, DO as PCP - [facility]    Luis Alfredo Hein DO   11/11/24   2:29 PM

## 2024-11-11 NOTE — PATIENT INSTRUCTIONS
Problem List Items Addressed This Visit             ICD-10-CM    Acquired hypothyroidism E03.9    Relevant Medications    levothyroxine (Synthroid, Levoxyl) 50 mcg tablet    Other Relevant Orders    Comprehensive Metabolic Panel    Lipid Panel    CBC    TSH with reflex to Free T4 if abnormal    Iron and TIBC    Vitamin D 25-Hydroxy,Total (for eval of Vitamin D levels)    Irritable bowel syndrome with constipation - Primary K58.1    Relevant Medications    Linzess 290 mcg capsule    Other Relevant Orders    Comprehensive Metabolic Panel    Lipid Panel    CBC    TSH with reflex to Free T4 if abnormal    Iron and TIBC    Vitamin D 25-Hydroxy,Total (for eval of Vitamin D levels)    Iron deficiency anemia D50.9    Relevant Orders    Comprehensive Metabolic Panel    Lipid Panel    CBC    TSH with reflex to Free T4 if abnormal    Iron and TIBC    Vitamin D 25-Hydroxy,Total (for eval of Vitamin D levels)    Vitamin D deficiency E55.9    Relevant Orders    Comprehensive Metabolic Panel    Lipid Panel    CBC    TSH with reflex to Free T4 if abnormal    Iron and TIBC    Vitamin D 25-Hydroxy,Total (for eval of Vitamin D levels)       Additional Visit Plans:  Plan for routine labs to monitor your chronic conditions and assess for any changes in your thyroid, vitamin D and iron dosing.  Follow-up every 6 months or sooner if needed    Stable thyromegaly - no follow up ultrasound needed but maybe repeat in a few years or if changing.     Next Wellness Exam/Annual Physical Due  Due at this time, return at your earliest convenience    Patient Care Team:  Luis Alfredo Hein DO as PCP - General (Family Medicine)  Luis Alfredo Hein DO as PCP - [facility] Medicaid PCP  Luis Alfredo Hein DO as PCP - [facility] PCP    Luis Alfredo Hein DO   11/11/24   2:29 PM

## 2025-03-03 ENCOUNTER — APPOINTMENT (OUTPATIENT)
Dept: OBSTETRICS AND GYNECOLOGY | Facility: CLINIC | Age: 35
End: 2025-03-03
Payer: COMMERCIAL

## 2025-03-03 ENCOUNTER — OFFICE VISIT (OUTPATIENT)
Dept: OBSTETRICS AND GYNECOLOGY | Facility: CLINIC | Age: 35
End: 2025-03-03
Payer: COMMERCIAL

## 2025-03-03 VITALS — BODY MASS INDEX: 22.47 KG/M2 | DIASTOLIC BLOOD PRESSURE: 64 MMHG | WEIGHT: 131 LBS | SYSTOLIC BLOOD PRESSURE: 112 MMHG

## 2025-03-03 DIAGNOSIS — Z01.419 WELL WOMAN EXAM: Primary | ICD-10-CM

## 2025-03-03 DIAGNOSIS — Z11.51 ENCOUNTER FOR SCREENING FOR HUMAN PAPILLOMAVIRUS (HPV): ICD-10-CM

## 2025-03-03 DIAGNOSIS — Z12.4 CERVICAL CANCER SCREENING: ICD-10-CM

## 2025-03-03 PROCEDURE — 99385 PREV VISIT NEW AGE 18-39: CPT | Performed by: OBSTETRICS & GYNECOLOGY

## 2025-03-03 PROCEDURE — 87491 CHLMYD TRACH DNA AMP PROBE: CPT | Performed by: OBSTETRICS & GYNECOLOGY

## 2025-03-03 PROCEDURE — 87661 TRICHOMONAS VAGINALIS AMPLIF: CPT | Performed by: OBSTETRICS & GYNECOLOGY

## 2025-03-03 ASSESSMENT — ENCOUNTER SYMPTOMS
DEPRESSION: 0
OCCASIONAL FEELINGS OF UNSTEADINESS: 0
LOSS OF SENSATION IN FEET: 0

## 2025-03-03 ASSESSMENT — PATIENT HEALTH QUESTIONNAIRE - PHQ9
SUM OF ALL RESPONSES TO PHQ9 QUESTIONS 1 & 2: 0
2. FEELING DOWN, DEPRESSED OR HOPELESS: NOT AT ALL
1. LITTLE INTEREST OR PLEASURE IN DOING THINGS: NOT AT ALL

## 2025-03-03 ASSESSMENT — PAIN SCALES - GENERAL: PAINLEVEL_OUTOF10: 0-NO PAIN

## 2025-03-03 NOTE — PROGRESS NOTES
ESTABLISHED ANNUAL GYN VISIT     Patient Name:  Elvin Osullivan  :  1990  MR #:  [id number]  Acct #:  [id number]      ASSESSMENT/PLAN:   1. Well woman exam (Primary)      2. Cervical cancer screening    - THINPREP PAP TEST  - HPV DNA High Risk With Genotype  - C. trachomatis / N. gonorrhoeae, Amplified, Urogenital  - Trichomonas vaginalis, Amplified    3. Encounter for screening for human papillomavirus (HPV)    - THINPREP PAP TEST  - HPV DNA High Risk With Genotype  - C. trachomatis / N. gonorrhoeae, Amplified, Urogenital  - Trichomonas vaginalis, Amplified      Counseling:  Medication education:  Education:  All new and/or current medications discussed and reviewed including side effects with patient/caregiver, Understanding:  Caregiver/Patient expressed understanding., Adherence:  Barriers to adherence identified and discussed if present.     OB/GYN Preventive:  Pap smear indicated every 5 years if normal and otherwise low risk. Self breast exam monthly and clinical breast examination yearly discussed.  Diet/Weight management discussed.  Screening colonoscopy recommended starting age 45, then Q3-10 years depending on testing and family history.  Osteoporosis prevention discussion included vit d3/calcium supplements, weight-bearing exercise.  Genitourinary skin hygiene discussed.    Discussed sterilization by bilateral salpingectomy.  She will call the office if desired.    Follow-up:  1 year      Chief Complaint:  Annual exam    HPI:  Elvin Osullivan is a 35 y.o.  Patient's last menstrual period was 2025 (exact date). C. female who presents for annual with pap      GYNH:   Yes, first onset 12  LMP:  Patient's last menstrual period was 2025 (exact date).  HPV Vaccine No.  Sexual activity Yes. [place] Yes.  Orientation: Straight  Birth control history: OCP    She is inquiring about sterilization options.     Past Medical History:   Diagnosis Date    Anxiety     Depression     Disease of  thyroid gland     Encounter for initial prescription of contraceptive pills 2013    Encounter for initial prescription of contraceptive pills       History reviewed. No pertinent surgical history.    Social History     Tobacco Use    Smoking status: Never     Passive exposure: Never    Smokeless tobacco: Never   Vaping Use    Vaping status: Never Used   Substance Use Topics    Alcohol use: Never    Drug use: Never        Family History   Problem Relation Name Age of Onset    Hypertension Mother      Anxiety disorder Mother      Hypertension Father      Hyperlipidemia Father      COPD Paternal Grandmother      Hypertension Paternal Grandmother      Diabetes Paternal Grandmother         OB History          1    Para        Term                AB        Living   1         SAB        IAB        Ectopic        Multiple        Live Births   1           Obstetric Comments   Vaginal delivery                Prior to Admission medications    Medication Sig Start Date End Date Taking? Authorizing Provider   cholecalciferol, vitamin D3, 50 mcg (2,000 unit) tablet,chewable Chew 2 capsules once daily.   Yes Historical Provider, MD   ferrous sulfate ER (Slow Fe) 142 mg ER tablet Take 1 tablet by mouth once daily. For 30 days   Yes Historical Provider, MD   levothyroxine (Synthroid, Levoxyl) 50 mcg tablet Take 1 tablet (50 mcg) by mouth once daily in the morning. Take before meals. on an empty stomach 11/11/24 5/10/25 Yes Luis Alfredo Hein DO   Linzess 290 mcg capsule Take 1 capsule (290 mcg) by mouth once daily in the morning. Take before meals. 11/11/24 5/10/25 Yes Luis Alfredo Hein DO       No Known Allergies    ROS:   [facility] - GENERAL:          Chills no.  Fever no.  Loss of Weight no.  Sweats no.  Fatigue no.  Weight gain no.   Vasomotor symptoms no  [facility] - RESPIRATORY:          Shortness of breath no.  Cough no.  Wheezing no.      [facility] - CARDIOVASCULAR:          Chest Pain no.  High Blood Pressure no.   Irregular Heart Beat no.  Low Blood Pressure no.  Rapid Heart Beat no.  Swelling of ankles no.  Varicose Veins no.      [facility] - GASTROINTESTINAL:          Appetite Poor no.  Bloating no.  Constipation no.  Diarrhea no.  Hemorrhoids no.  Indigestion no.  Nausea no.  Rectal Bleeding no.  Stomach Pain no  Vomiting no.  Vomiting Blood no.      [facility] - GENITO-URINARY:          Blood in Urine no.  Frequent Urination no.  Lack of Bladder Control no.  Painful Urination no.  Heavy/Irregular periods no .  Vaginal discharge no.  Vaginal odor no.  Vaginal itching no.  Post-coital bleeding no.      [facility] - MUSCLE/JOINT/BONE:          Back no.  Joint pain yes.  Muscle pain no.      [facility] - SKIN:          Bruise easily no.  Itching no.  Change in Moles no.  Rash no.  Sore that won't heal no.  Vulvar Lesions no.  Acne no acute problems.      [facility] - ROS Update:          Depression or anxiety problems no.          OBJECTIVE:   /64   Wt 59.4 kg (131 lb)   LMP 02/14/2025 (Exact Date)   BMI 22.47 kg/m²   Body mass index is 22.47 kg/m².     Physical Exam  GENERAL:   General Appearance:  well-developed, well-nourished, no functional handicap, well-groomed.  Hygiene:  good.  Ill-appearance:  none.  Mental Status:  alert and oriented.  Mood/Affect:  pleasant, appropriate.  Speech:  clear.  Eye contact:  normal.  Appears stated age:  yes.  HEENT: Bulky thyroid palpated.  Mobile.  LUNGS:  Effort:  no respiratory distress.    BREASTS: General:  no masses, no tenderness, no skin changes, no nipple abnormality, and no axillary lymphadenopathy.   ABDOMEN: Tenderness:  none.  Distention:  none.   GENITOURINARY - FEMALE: Bladder:  normal.  Pelvic support defects:  not seen .  External genitalia:  Normal.  Urethra:  Normal.  Vagina:  no lesions, moderate discharge, GC/CT: yes - requested.  Cervix/ cuff:  normal appearance; Pap smear collected.  Uterus:  normal size/shape/consistency, non tender.  Adnexa:  normal , non tender.   DERMATOLOGY:  Skin:   normal.   EXTREMITIES: Normal:  no anomalies.  Edema:  none.    NEUROLOGICAL: Orientation:  alert and oriented x 3.   PSYCHOLOGY: Affect:  appropriate.  Mood:  pleasant.      Labs reviewed: Yes - Pap smears    Imaging reviewed: Yes - Thyroid US    Note: This dictation was generated using Dragon voice recognition software. Please excuse any grammatical or spelling errors that may have occurred using the system.

## 2025-03-05 LAB
C TRACH RRNA SPEC QL NAA+PROBE: NEGATIVE
N GONORRHOEA DNA SPEC QL PROBE+SIG AMP: NEGATIVE
T VAGINALIS RRNA SPEC QL NAA+PROBE: NEGATIVE

## 2025-03-19 LAB

## 2025-04-29 ENCOUNTER — APPOINTMENT (OUTPATIENT)
Dept: PRIMARY CARE | Facility: CLINIC | Age: 35
End: 2025-04-29
Payer: COMMERCIAL

## 2025-05-20 RX ORDER — BUTALBITAL AND ACETAMINOPHEN 325; 50 MG/1; MG/1
TABLET ORAL
COMMUNITY
End: 2025-05-22 | Stop reason: ALTCHOICE

## 2025-05-20 RX ORDER — AMITRIPTYLINE HYDROCHLORIDE 10 MG/1
TABLET, FILM COATED ORAL EVERY 24 HOURS
COMMUNITY
End: 2025-05-22 | Stop reason: ALTCHOICE

## 2025-05-22 ENCOUNTER — APPOINTMENT (OUTPATIENT)
Dept: PRIMARY CARE | Facility: CLINIC | Age: 35
End: 2025-05-22
Payer: COMMERCIAL

## 2025-05-22 VITALS
HEIGHT: 64 IN | TEMPERATURE: 98.4 F | BODY MASS INDEX: 22.71 KG/M2 | DIASTOLIC BLOOD PRESSURE: 64 MMHG | WEIGHT: 133 LBS | SYSTOLIC BLOOD PRESSURE: 100 MMHG

## 2025-05-22 DIAGNOSIS — E03.9 ACQUIRED HYPOTHYROIDISM: ICD-10-CM

## 2025-05-22 DIAGNOSIS — F41.1 GENERALIZED ANXIETY DISORDER: Primary | ICD-10-CM

## 2025-05-22 DIAGNOSIS — D50.8 IRON DEFICIENCY ANEMIA SECONDARY TO INADEQUATE DIETARY IRON INTAKE: ICD-10-CM

## 2025-05-22 DIAGNOSIS — K58.1 IRRITABLE BOWEL SYNDROME WITH CONSTIPATION: ICD-10-CM

## 2025-05-22 DIAGNOSIS — E55.9 VITAMIN D DEFICIENCY: ICD-10-CM

## 2025-05-22 DIAGNOSIS — F32.A MODERATELY SEVERE DEPRESSION: ICD-10-CM

## 2025-05-22 DIAGNOSIS — I47.10 SVT (SUPRAVENTRICULAR TACHYCARDIA): ICD-10-CM

## 2025-05-22 PROBLEM — G90.A POTS (POSTURAL ORTHOSTATIC TACHYCARDIA SYNDROME): Status: RESOLVED | Noted: 2023-10-23 | Resolved: 2025-05-22

## 2025-05-22 LAB
25(OH)D3 SERPL-MCNC: 24 NG/ML (ref 30–100)
ANION GAP SERPL CALC-SCNC: 15 MMOL/L (ref 10–20)
BASOPHILS # BLD AUTO: 0.03 X10*3/UL (ref 0.1–1.6)
BASOPHILS NFR BLD AUTO: 0.5 % (ref 0–0.3)
BUN SERPL-MCNC: 7 MG/DL (ref 7–18)
CALCIUM SERPL-MCNC: 9.3 MG/DL (ref 8.5–10.1)
CHLORIDE SERPL-SCNC: 104 MMOL/L (ref 98–107)
CO2 SERPL-SCNC: 23 MMOL/L (ref 21–32)
CREAT SERPL-MCNC: 0.66 MG/DL (ref 0.6–1.1)
EGFRCR SERPLBLD CKD-EPI 2021: >90 ML/MIN/1.73M*2
EOSINOPHIL # BLD AUTO: 0.11 X10*3/UL (ref 0.04–0.5)
EOSINOPHIL NFR BLD AUTO: 1.71 % (ref 0.7–7)
ERYTHROCYTE [DISTWIDTH] IN BLOOD BY AUTOMATED COUNT: 15.1 % (ref 11.5–14.5)
GLUCOSE SERPL-MCNC: 90 MG/DL (ref 74–100)
HCT VFR BLD AUTO: 40.4 % (ref 36.6–46.6)
HGB BLD-MCNC: 14.2 G/DL (ref 12–15.4)
LYMPHOCYTES # BLD AUTO: 1.43 X10*3/UL (ref 0–6)
LYMPHOCYTES NFR BLD AUTO: 23.2 % (ref 20.5–51.1)
MCH RBC QN AUTO: 31.1 PG (ref 26–32)
MCHC RBC AUTO-ENTMCNC: 35.1 G/DL (ref 31–38)
MCV RBC AUTO: 88.3 FL (ref 80–96)
MONOCYTES # BLD AUTO: 0.56 X10*3/UL (ref 1.6–24.9)
MONOCYTES NFR BLD AUTO: 9.1 % (ref 1.7–9.3)
NEUTROPHILS # BLD AUTO: 4.04 X10*3/UL (ref 1.4–6.5)
NEUTROPHILS NFR BLD AUTO: 65.49 % (ref 42.2–75.2)
PLATELET # BLD AUTO: 445.9 X10*3/UL (ref 150–450)
PMV BLD AUTO: 8.36 FL (ref 7.8–11)
POTASSIUM SERPL-SCNC: 4.1 MMOL/L (ref 3.5–5.1)
RBC # BLD AUTO: 4.57 X10*6/UL (ref 3.9–5.3)
SODIUM SERPL-SCNC: 138 MMOL/L (ref 136–145)
TSH SERPL-ACNC: 2.96 MIU/L (ref 0.44–3.98)
WBC # BLD AUTO: 6.17 X10*3/UL (ref 4.5–10.5)

## 2025-05-22 RX ORDER — BUSPIRONE HYDROCHLORIDE 5 MG/1
5 TABLET ORAL 3 TIMES DAILY
Qty: 90 TABLET | Refills: 2 | Status: SHIPPED | OUTPATIENT
Start: 2025-05-22 | End: 2025-08-20

## 2025-05-22 ASSESSMENT — ENCOUNTER SYMPTOMS
NERVOUS/ANXIOUS: 1
COUGH: 0
APPETITE CHANGE: 0
DIARRHEA: 0
DIZZINESS: 0
SLEEP DISTURBANCE: 0
MYALGIAS: 0
FEVER: 0
CHOKING: 0
POLYDIPSIA: 0
DEPRESSION: 0
FREQUENCY: 0
PALPITATIONS: 0
WEAKNESS: 0
COLOR CHANGE: 0
NAUSEA: 0
FATIGUE: 0
CONSTIPATION: 0
POLYPHAGIA: 0
TREMORS: 0
CHEST TIGHTNESS: 0
ARTHRALGIAS: 0
ANAL BLEEDING: 0
EYE PAIN: 0
JOINT SWELLING: 0
WHEEZING: 0
HEADACHES: 0
EYE DISCHARGE: 0
DIFFICULTY URINATING: 0
NUMBNESS: 0
VOMITING: 0
ABDOMINAL DISTENTION: 0
ABDOMINAL PAIN: 0
CONFUSION: 0
CHILLS: 0
LOSS OF SENSATION IN FEET: 0
FACIAL SWELLING: 0
OCCASIONAL FEELINGS OF UNSTEADINESS: 0
SORE THROAT: 0
HEMATURIA: 0
SHORTNESS OF BREATH: 0

## 2025-05-22 ASSESSMENT — PAIN SCALES - GENERAL: PAINLEVEL_OUTOF10: 0-NO PAIN

## 2025-05-22 ASSESSMENT — PATIENT HEALTH QUESTIONNAIRE - PHQ9
1. LITTLE INTEREST OR PLEASURE IN DOING THINGS: NOT AT ALL
2. FEELING DOWN, DEPRESSED OR HOPELESS: NOT AT ALL
SUM OF ALL RESPONSES TO PHQ9 QUESTIONS 1 AND 2: 0

## 2025-05-22 ASSESSMENT — COLUMBIA-SUICIDE SEVERITY RATING SCALE - C-SSRS
6. HAVE YOU EVER DONE ANYTHING, STARTED TO DO ANYTHING, OR PREPARED TO DO ANYTHING TO END YOUR LIFE?: NO
1. IN THE PAST MONTH, HAVE YOU WISHED YOU WERE DEAD OR WISHED YOU COULD GO TO SLEEP AND NOT WAKE UP?: NO
2. HAVE YOU ACTUALLY HAD ANY THOUGHTS OF KILLING YOURSELF?: NO

## 2025-05-22 NOTE — PROGRESS NOTES
Subjective   Patient ID: Elvin Osullivan is a 35 y.o. female with a history of iron deficiency anemia, hypothyroidism, anxiety, depression, IBS with constipation, headaches, vitamin D deficiency who presents for New Patient Visit.    HPI established as a new patient with a primary care provider.    Anxiety and depression - has a lot of stress since she is single mother. She is worrying about multiple things. She feels depressed couple of times a week and has crying spells once a week.  Stated that she has more anxiety than depression.  She tries to cope by cleaning since it takes her attention away.  She was on Hydroxyzine, Zoloft and Lexapro in the past but developed side effects.     Insomnia - she has not been sleeping well for couple of years.  She reports raising thoughts. She had tried melatonin without improvement.     Headache - not as frequent lately. Takes Tylenol prn.      Iron deficiency anemia - tries to take iron supplement daily.    IBS with constipation - stable. She takes Linzess to 90 mcg daily    Vitamin D deficiency - takes vitamin D 3 1000 U daily.    Review of Systems   Constitutional:  Negative for appetite change, chills, fatigue and fever.   HENT:  Negative for congestion, ear pain, facial swelling, hearing loss, nosebleeds and sore throat.    Eyes:  Negative for pain, discharge and visual disturbance.   Respiratory:  Negative for cough, choking, chest tightness, shortness of breath and wheezing.    Cardiovascular:  Negative for chest pain, palpitations and leg swelling.   Gastrointestinal:  Negative for abdominal distention, abdominal pain, anal bleeding, constipation, diarrhea, nausea and vomiting.   Endocrine: Negative for cold intolerance, heat intolerance, polydipsia, polyphagia and polyuria.   Genitourinary:  Negative for difficulty urinating, frequency, hematuria and urgency.   Musculoskeletal:  Negative for arthralgias, gait problem, joint swelling and myalgias.   Skin:  Negative for  "color change and rash.   Neurological:  Negative for dizziness, tremors, syncope, weakness, numbness and headaches.   Psychiatric/Behavioral:  Negative for behavioral problems, confusion, sleep disturbance and suicidal ideas. The patient is nervous/anxious.         Depression.       Objective   /64 (Patient Position: Sitting)   Temp 36.9 °C (98.4 °F) (Temporal)   Ht 1.626 m (5' 4\")   Wt 60.3 kg (133 lb)   LMP 04/12/2025   Breastfeeding No   BMI 22.83 kg/m²     Physical Exam  Constitutional:       General: She is not in acute distress.     Appearance: Normal appearance.   HENT:      Head: Normocephalic and atraumatic.      Nose: Nose normal.   Eyes:      Extraocular Movements: Extraocular movements intact.      Conjunctiva/sclera: Conjunctivae normal.      Pupils: Pupils are equal, round, and reactive to light.   Cardiovascular:      Rate and Rhythm: Normal rate and regular rhythm.      Pulses: Normal pulses.      Heart sounds: Normal heart sounds.   Pulmonary:      Effort: Pulmonary effort is normal.      Breath sounds: Normal breath sounds.   Abdominal:      General: Bowel sounds are normal.      Palpations: Abdomen is soft.   Musculoskeletal:         General: Normal range of motion.      Cervical back: Normal range of motion and neck supple.   Neurological:      General: No focal deficit present.      Mental Status: She is alert and oriented to person, place, and time.   Psychiatric:         Behavior: Behavior normal.         Thought Content: Thought content normal.         Judgment: Judgment normal.      Comments: Crying         Assessment/Plan     Anxiety and depression  Failed sertraline and Lexapro in the past due to abdominal pain  Start buspirone 5 mg 3 times a day  May increase to 2 or 3 tablets 2-3 times a day as needed  Reviewed side effects of medications  Patient voiced full understanding of side effects  call if any side effects  To COVID anxiety by breathing exercises, yoga, working out, " meditation  If no improvement, will consider duloxetine    Insomnia  Take buspirone 5 mg before bed to decrease racing thoughts  If not effective will consider trazodone in future    Headaches  Stable  Takes Tylenol as needed    Hypothyroidism  Continue levothyroxine 50 mcg daily  Continue to exercise regularly  Report any changes, such as, weight gain or weight loss  We will repeat TSH    Iron deficiency anemia  Takes iron supplement daily    Vitamin D deficiency  Currently on vitamin D3 1000 units daily    IBS with constipation  Stable  Continue Linzess to 90 mcg daily    We obtained blood work  I will call with results    Follow-up in 2 weeks

## 2025-05-23 ENCOUNTER — TELEPHONE (OUTPATIENT)
Dept: PRIMARY CARE | Facility: CLINIC | Age: 35
End: 2025-05-23
Payer: COMMERCIAL

## 2025-05-23 LAB
FERRITIN SERPL-MCNC: 7 NG/ML (ref 16–154)
IRON SATN MFR SERPL: 43 % (CALC) (ref 16–45)
IRON SERPL-MCNC: 175 MCG/DL (ref 40–190)
TIBC SERPL-MCNC: 410 MCG/DL (CALC) (ref 250–450)

## 2025-05-23 NOTE — TELEPHONE ENCOUNTER
----- Message from Sailaja Khoury sent at 5/22/2025  1:45 PM EDT -----  Please call her with the results.  Overall her blood work is pretty good except slightly low vitamin D.  She needs to take OTC vitamin D3 5000 units daily.  TSH is in within normal limits.  Continue   levothyroxine 50 mcg.  Her hemoglobin is normal which went up to 14.2.  Iron and ferritin are pending.  She can take iron supplement couple of times a week.  ----- Message -----  From: Lab, Background User  Sent: 5/22/2025  12:37 PM EDT  To: Sailaja Khoury PA-C

## 2025-06-09 ENCOUNTER — APPOINTMENT (OUTPATIENT)
Dept: PRIMARY CARE | Facility: CLINIC | Age: 35
End: 2025-06-09
Payer: COMMERCIAL

## 2025-07-30 ENCOUNTER — TELEPHONE (OUTPATIENT)
Dept: PRIMARY CARE | Facility: CLINIC | Age: 35
End: 2025-07-30
Payer: COMMERCIAL

## 2025-07-30 DIAGNOSIS — K58.1 IRRITABLE BOWEL SYNDROME WITH CONSTIPATION: ICD-10-CM
